# Patient Record
Sex: MALE | Race: WHITE | URBAN - METROPOLITAN AREA
[De-identification: names, ages, dates, MRNs, and addresses within clinical notes are randomized per-mention and may not be internally consistent; named-entity substitution may affect disease eponyms.]

---

## 2023-01-28 ENCOUNTER — OFFICE (OUTPATIENT)
Dept: URBAN - METROPOLITAN AREA CLINIC 6 | Facility: CLINIC | Age: 83
Setting detail: OPHTHALMOLOGY
End: 2023-01-28
Payer: MEDICARE

## 2023-01-28 DIAGNOSIS — H16.223: ICD-10-CM

## 2023-01-28 DIAGNOSIS — H11.042: ICD-10-CM

## 2023-01-28 DIAGNOSIS — H01.005: ICD-10-CM

## 2023-01-28 DIAGNOSIS — H40.053: ICD-10-CM

## 2023-01-28 DIAGNOSIS — H01.001: ICD-10-CM

## 2023-01-28 DIAGNOSIS — H01.004: ICD-10-CM

## 2023-01-28 DIAGNOSIS — H01.002: ICD-10-CM

## 2023-01-28 DIAGNOSIS — H25.13: ICD-10-CM

## 2023-01-28 PROCEDURE — 92250 FUNDUS PHOTOGRAPHY W/I&R: CPT | Performed by: OPHTHALMOLOGY

## 2023-01-28 PROCEDURE — 92014 COMPRE OPH EXAM EST PT 1/>: CPT | Performed by: OPHTHALMOLOGY

## 2023-01-28 PROCEDURE — 92083 EXTENDED VISUAL FIELD XM: CPT | Performed by: OPHTHALMOLOGY

## 2023-01-28 PROCEDURE — 92285 EXTERNAL OCULAR PHOTOGRAPHY: CPT | Performed by: OPHTHALMOLOGY

## 2023-01-28 ASSESSMENT — REFRACTION_CURRENTRX
OS_CYLINDER: -2.50
OS_VPRISM_DIRECTION: PROGS
OD_CYLINDER: +0.75
OS_OVR_VA: 20/
OS_CYLINDER: +0.75
OD_ADD: +2.50
OS_OVR_VA: 20/
OD_SPHERE: +2.75
OS_ADD: +2.50
OS_SPHERE: +2.25
OD_VPRISM_DIRECTION: PROGS
OD_VPRISM_DIRECTION: SV
OS_AXIS: 89
OS_AXIS: 3
OS_SPHERE: +3.25
OD_OVR_VA: 20/
OD_SPHERE: +2.25
OD_AXIS: 88
OD_AXIS: 5
OD_OVR_VA: 20/
OD_CYLINDER: -2.00
OS_VPRISM_DIRECTION: SV

## 2023-01-28 ASSESSMENT — REFRACTION_AUTOREFRACTION
OS_AXIS: 90
OS_SPHERE: +3.50
OD_CYLINDER: -2.50
OD_AXIS: 97
OD_SPHERE: +3.25
OS_CYLINDER: -2.25

## 2023-01-28 ASSESSMENT — REFRACTION_MANIFEST
OS_SPHERE: +3.50
OS_CYLINDER: -2.25
OD_AXIS: 97
OD_SPHERE: +3.25
OD_CYLINDER: -2.50
OU_VA: 20/25-2
OS_AXIS: 90
OS_VA1: 20/30-
OD_VA1: 20/20-
OS_ADD: +2.75
OD_ADD: +2.75

## 2023-01-28 ASSESSMENT — KERATOMETRY
OS_K2POWER_DIOPTERS: 44.00
OS_AXISANGLE_DEGREES: 145
OD_K1POWER_DIOPTERS: 43.50
METHOD_AUTO_MANUAL: AUTO
OD_K2POWER_DIOPTERS: 44.50
OS_K1POWER_DIOPTERS: 43.50
OD_AXISANGLE_DEGREES: 69

## 2023-01-28 ASSESSMENT — CONFRONTATIONAL VISUAL FIELD TEST (CVF)
OS_FINDINGS: FULL
OD_FINDINGS: FULL

## 2023-01-28 ASSESSMENT — SPHEQUIV_DERIVED
OD_SPHEQUIV: 2
OD_SPHEQUIV: 2
OS_SPHEQUIV: 2.375
OS_SPHEQUIV: 2.375

## 2023-01-28 ASSESSMENT — AXIALLENGTH_DERIVED
OD_AL: 22.6668
OS_AL: 22.6168
OS_AL: 22.6168
OD_AL: 22.6668

## 2023-01-28 ASSESSMENT — PACHYMETRY
OD_CT_UM: 620
OD_CT_CORRECTION: -6
OS_CT_UM: 630
OS_CT_CORRECTION: -6

## 2023-01-28 ASSESSMENT — LID EXAM ASSESSMENTS
OS_BLEPHARITIS: LUL T 1+
OD_BLEPHARITIS: RUL T 1+

## 2023-01-28 ASSESSMENT — VISUAL ACUITY
OD_BCVA: 20/40-
OS_BCVA: 20/25+

## 2023-01-28 ASSESSMENT — SUPERFICIAL PUNCTATE KERATITIS (SPK)
OS_SPK: T
OD_SPK: T

## 2023-01-28 ASSESSMENT — CORNEAL PTERYGIUM: OS_PTERYGIUM: NASAL 4MM 3MM

## 2024-01-31 ENCOUNTER — OFFICE (OUTPATIENT)
Dept: URBAN - METROPOLITAN AREA CLINIC 6 | Facility: CLINIC | Age: 84
Setting detail: OPHTHALMOLOGY
End: 2024-01-31
Payer: MEDICARE

## 2024-01-31 DIAGNOSIS — H16.223: ICD-10-CM

## 2024-01-31 DIAGNOSIS — H01.004: ICD-10-CM

## 2024-01-31 DIAGNOSIS — H01.005: ICD-10-CM

## 2024-01-31 DIAGNOSIS — H25.13: ICD-10-CM

## 2024-01-31 DIAGNOSIS — H01.002: ICD-10-CM

## 2024-01-31 DIAGNOSIS — H01.001: ICD-10-CM

## 2024-01-31 DIAGNOSIS — H11.042: ICD-10-CM

## 2024-01-31 DIAGNOSIS — H40.053: ICD-10-CM

## 2024-01-31 PROCEDURE — 92014 COMPRE OPH EXAM EST PT 1/>: CPT | Performed by: OPHTHALMOLOGY

## 2024-01-31 PROCEDURE — 92083 EXTENDED VISUAL FIELD XM: CPT | Performed by: OPHTHALMOLOGY

## 2024-01-31 PROCEDURE — 92250 FUNDUS PHOTOGRAPHY W/I&R: CPT | Performed by: OPHTHALMOLOGY

## 2024-01-31 ASSESSMENT — REFRACTION_CURRENTRX
OS_AXIS: 089
OS_SPHERE: +3.25
OS_VPRISM_DIRECTION: SV
OS_ADD: +2.50
OS_CYLINDER: -2.50
OD_OVR_VA: 20/
OS_OVR_VA: 20/
OD_AXIS: 089
OS_OVR_VA: 20/
OD_CYLINDER: +0.75
OD_VPRISM_DIRECTION: SV
OD_AXIS: 5
OD_SPHERE: +2.25
OD_OVR_VA: 20/
OS_VPRISM_DIRECTION: PROGS
OD_VPRISM_DIRECTION: PROGS
OS_SPHERE: +2.25
OS_AXIS: 3
OD_SPHERE: +2.50
OD_ADD: +2.50
OD_CYLINDER: -1.75
OS_CYLINDER: +0.75

## 2024-01-31 ASSESSMENT — CONFRONTATIONAL VISUAL FIELD TEST (CVF)
OS_FINDINGS: FULL
OD_FINDINGS: FULL

## 2024-01-31 ASSESSMENT — REFRACTION_MANIFEST
OS_CYLINDER: -2.00
OD_SPHERE: +3.50
OD_ADD: +2.75
OD_AXIS: 095
OD_CYLINDER: -2.75
OS_VA1: 20/30-
OS_AXIS: 090
OS_ADD: +2.75
OU_VA: 20/20-2
OD_VA1: 20/25+2
OS_SPHERE: +3.25

## 2024-01-31 ASSESSMENT — SPHEQUIV_DERIVED
OD_SPHEQUIV: 2.125
OS_SPHEQUIV: 2.25

## 2024-01-31 ASSESSMENT — LID EXAM ASSESSMENTS
OS_BLEPHARITIS: LUL T 1+
OD_BLEPHARITIS: RUL T 1+

## 2024-08-20 PROBLEM — Z00.00 ENCOUNTER FOR PREVENTIVE HEALTH EXAMINATION: Status: ACTIVE | Noted: 2024-08-20

## 2024-08-26 ENCOUNTER — APPOINTMENT (OUTPATIENT)
Dept: CARDIOLOGY | Facility: CLINIC | Age: 84
End: 2024-08-26
Payer: MEDICARE

## 2024-08-26 VITALS
OXYGEN SATURATION: 98 % | WEIGHT: 179 LBS | DIASTOLIC BLOOD PRESSURE: 60 MMHG | HEIGHT: 70 IN | HEART RATE: 74 BPM | BODY MASS INDEX: 25.62 KG/M2 | SYSTOLIC BLOOD PRESSURE: 130 MMHG

## 2024-08-26 DIAGNOSIS — Z01.810 ENCOUNTER FOR PREPROCEDURAL CARDIOVASCULAR EXAMINATION: ICD-10-CM

## 2024-08-26 DIAGNOSIS — R01.1 CARDIAC MURMUR, UNSPECIFIED: ICD-10-CM

## 2024-08-26 DIAGNOSIS — Z78.9 OTHER SPECIFIED HEALTH STATUS: ICD-10-CM

## 2024-08-26 DIAGNOSIS — Z86.79 PERSONAL HISTORY OF OTHER DISEASES OF THE CIRCULATORY SYSTEM: ICD-10-CM

## 2024-08-26 DIAGNOSIS — R94.31 ABNORMAL ELECTROCARDIOGRAM [ECG] [EKG]: ICD-10-CM

## 2024-08-26 DIAGNOSIS — N40.0 BENIGN PROSTATIC HYPERPLASIA WITHOUT LOWER URINARY TRACT SYMPMS: ICD-10-CM

## 2024-08-26 DIAGNOSIS — I10 ESSENTIAL (PRIMARY) HYPERTENSION: ICD-10-CM

## 2024-08-26 PROCEDURE — 99204 OFFICE O/P NEW MOD 45 MIN: CPT

## 2024-08-26 PROCEDURE — 93000 ELECTROCARDIOGRAM COMPLETE: CPT

## 2024-08-26 RX ORDER — TAMSULOSIN HYDROCHLORIDE 0.4 MG/1
0.4 CAPSULE ORAL DAILY
Refills: 0 | Status: ACTIVE | COMMUNITY

## 2024-08-26 RX ORDER — VALSARTAN AND HYDROCHLOROTHIAZIDE 320; 12.5 MG/1; MG/1
320-12.5 TABLET, FILM COATED ORAL DAILY
Refills: 0 | Status: ACTIVE | COMMUNITY

## 2024-08-26 NOTE — HISTORY OF PRESENT ILLNESS
[FreeTextEntry1] : The patient is an 84-year-old white male with a past medical history remarkable for hypertension who presents for evaluation prior to shoulder surgery. The patient performs resistance training and cardio 4 days/week.  He is chest pain and dyspnea free.  Recent laboratory results are unavailable

## 2024-08-26 NOTE — PHYSICAL EXAM
[Well Developed] : well developed [Well Nourished] : well nourished [No Acute Distress] : no acute distress [Normal Conjunctiva] : normal conjunctiva [Normal Venous Pressure] : normal venous pressure [No Carotid Bruit] : no carotid bruit [Normal S1, S2] : normal S1, S2 [No Rub] : no rub [No Gallop] : no gallop [Clear Lung Fields] : clear lung fields [Good Air Entry] : good air entry [No Respiratory Distress] : no respiratory distress  [Soft] : abdomen soft [Non Tender] : non-tender [No Masses/organomegaly] : no masses/organomegaly [Normal Bowel Sounds] : normal bowel sounds [Normal Gait] : normal gait [No Edema] : no edema [No Cyanosis] : no cyanosis [No Clubbing] : no clubbing [No Varicosities] : no varicosities [No Rash] : no rash [No Skin Lesions] : no skin lesions [Moves all extremities] : moves all extremities [No Focal Deficits] : no focal deficits [Normal Speech] : normal speech [Alert and Oriented] : alert and oriented [Normal memory] : normal memory [de-identified] : 2/6 systolic murmur at the base and apex

## 2024-08-26 NOTE — PHYSICAL EXAM
[Well Developed] : well developed [Well Nourished] : well nourished [No Acute Distress] : no acute distress [Normal Conjunctiva] : normal conjunctiva [Normal Venous Pressure] : normal venous pressure [No Carotid Bruit] : no carotid bruit [Normal S1, S2] : normal S1, S2 [No Rub] : no rub [No Gallop] : no gallop [Clear Lung Fields] : clear lung fields [Good Air Entry] : good air entry [No Respiratory Distress] : no respiratory distress  [Soft] : abdomen soft [Non Tender] : non-tender [No Masses/organomegaly] : no masses/organomegaly [Normal Bowel Sounds] : normal bowel sounds [Normal Gait] : normal gait [No Edema] : no edema [No Cyanosis] : no cyanosis [No Clubbing] : no clubbing [No Varicosities] : no varicosities [No Rash] : no rash [No Skin Lesions] : no skin lesions [Moves all extremities] : moves all extremities [No Focal Deficits] : no focal deficits [Normal Speech] : normal speech [Alert and Oriented] : alert and oriented [Normal memory] : normal memory [de-identified] : 2/6 systolic murmur at the base and apex

## 2024-08-26 NOTE — DISCUSSION/SUMMARY
[FreeTextEntry1] : Murmur of aortic sclerosis and mitral regurgitation An echocardiogram was ordered to rule out a cardiomyopathy or valvular abnormality as the etiology of his murmur and abnormal ECG  Abnormal ECG Mr. MAXWELL HILL  was instructed to undergo treadmill nuclear stress testing to rule out myocardial ischemia as the etiology of his   abnormal ECG.  Preoperative cardiovascular evaluation: Shoulder surgery Evaluation as noted above  RTO after testing [EKG obtained to assist in diagnosis and management of assessed problem(s)] : EKG obtained to assist in diagnosis and management of assessed problem(s)

## 2024-08-26 NOTE — CARDIOLOGY SUMMARY
[de-identified] : Normal sinus rhythm, Q-wave lead III [de-identified] : - Abnormal ECG -Hypertension

## 2024-08-26 NOTE — CARDIOLOGY SUMMARY
[de-identified] : Normal sinus rhythm, Q-wave lead III [de-identified] : - Abnormal ECG -Hypertension

## 2024-09-06 ENCOUNTER — APPOINTMENT (OUTPATIENT)
Dept: CARDIOLOGY | Facility: CLINIC | Age: 84
End: 2024-09-06
Payer: MEDICARE

## 2024-09-06 PROCEDURE — 93306 TTE W/DOPPLER COMPLETE: CPT

## 2024-09-24 ENCOUNTER — APPOINTMENT (OUTPATIENT)
Dept: CARDIOLOGY | Facility: CLINIC | Age: 84
End: 2024-09-24
Payer: MEDICARE

## 2024-09-24 PROCEDURE — A9502: CPT

## 2024-09-24 PROCEDURE — 78452 HT MUSCLE IMAGE SPECT MULT: CPT

## 2024-09-24 PROCEDURE — 93015 CV STRESS TEST SUPVJ I&R: CPT

## 2024-09-30 ENCOUNTER — APPOINTMENT (OUTPATIENT)
Dept: CARDIOLOGY | Facility: CLINIC | Age: 84
End: 2024-09-30
Payer: MEDICARE

## 2024-09-30 VITALS
SYSTOLIC BLOOD PRESSURE: 138 MMHG | HEIGHT: 69 IN | WEIGHT: 180 LBS | OXYGEN SATURATION: 96 % | DIASTOLIC BLOOD PRESSURE: 66 MMHG | BODY MASS INDEX: 26.66 KG/M2 | HEART RATE: 64 BPM

## 2024-09-30 DIAGNOSIS — I34.0 NONRHEUMATIC MITRAL (VALVE) INSUFFICIENCY: ICD-10-CM

## 2024-09-30 DIAGNOSIS — R01.1 CARDIAC MURMUR, UNSPECIFIED: ICD-10-CM

## 2024-09-30 DIAGNOSIS — Z01.810 ENCOUNTER FOR PREPROCEDURAL CARDIOVASCULAR EXAMINATION: ICD-10-CM

## 2024-09-30 DIAGNOSIS — I35.0 NONRHEUMATIC AORTIC (VALVE) STENOSIS: ICD-10-CM

## 2024-09-30 PROCEDURE — 99214 OFFICE O/P EST MOD 30 MIN: CPT

## 2024-09-30 NOTE — CARDIOLOGY SUMMARY
[de-identified] : - Abnormal ECG -Hypertension - ECHOCARDIOGRAM: 9/6/2024, EF 60%, Moderate mitral regurgitation, trace tricuspid regurgitation PA systolic 25, mild aortic stenosis, aortic valve area 1.8 cm, V1 =1.1, V2 = 1.96, mean gradient 9, peak gradient 15 -NUCLEAR STRESS TEST: 9/24/2024 normal, EF 64%, excellent exercise tolerance

## 2024-09-30 NOTE — HISTORY OF PRESENT ILLNESS
[FreeTextEntry1] : The patient is an 84-year-old white male with a past medical history remarkable for hypertension who presented for evaluation prior to shoulder surgery. Echocardiography demonstrated a normal ejection fraction, moderate mitral regurgitation and only mild aortic stenosis.  Nuclear stress testing demonstrated normal perfusion without evidence of a myocardial infarction or myocardial ischemia The patient performs resistance training and cardio 4 days/week.  He is chest pain and dyspnea free.  Recent laboratory results remain unavailable

## 2024-09-30 NOTE — PHYSICAL EXAM
[Well Developed] : well developed [Well Nourished] : well nourished [No Acute Distress] : no acute distress [Normal Conjunctiva] : normal conjunctiva [Normal Venous Pressure] : normal venous pressure [No Carotid Bruit] : no carotid bruit [Normal S1, S2] : normal S1, S2 [No Rub] : no rub [No Gallop] : no gallop [Clear Lung Fields] : clear lung fields [Good Air Entry] : good air entry [No Respiratory Distress] : no respiratory distress  [Soft] : abdomen soft [Non Tender] : non-tender [No Masses/organomegaly] : no masses/organomegaly [Normal Bowel Sounds] : normal bowel sounds [Normal Gait] : normal gait [No Edema] : no edema [No Cyanosis] : no cyanosis [No Clubbing] : no clubbing [No Varicosities] : no varicosities [No Rash] : no rash [No Skin Lesions] : no skin lesions [Moves all extremities] : moves all extremities [No Focal Deficits] : no focal deficits [Normal Speech] : normal speech [Alert and Oriented] : alert and oriented [Normal memory] : normal memory [de-identified] : 2/6 systolic murmur at the base and apex

## 2024-09-30 NOTE — DISCUSSION/SUMMARY
[FreeTextEntry1] : Preoperative cardiovascular evaluation: Shoulder  In view of the patient's normal ejection fraction, normal stress test, adequate functional capacity, and the absence of unstable angina, congestive heart failure, or severe aortic stenosis, he is at average risk for perioperative cardiovascular morbidity and mortality, and cleared from a cardiology standpoint with the following recommendations: Perioperative beta-blockers, endocarditis prophylaxis, and a postoperative monitored bed are not indicated. DVT prophylaxis at the surgeon's discretion.  murmur of aortic sclerosis and mitral regurgitation Mild aortic stenosis and moderate mitral regurgitation as noted above    Abnormal ECG Normal LV function and the absence of myocardial ischemia by echocardiography and nuclear stress testing   RTO 1 year

## 2025-02-11 ENCOUNTER — OFFICE (OUTPATIENT)
Dept: URBAN - METROPOLITAN AREA CLINIC 6 | Facility: CLINIC | Age: 85
Setting detail: OPHTHALMOLOGY
End: 2025-02-11
Payer: MEDICARE

## 2025-02-11 DIAGNOSIS — H01.002: ICD-10-CM

## 2025-02-11 DIAGNOSIS — H01.005: ICD-10-CM

## 2025-02-11 DIAGNOSIS — H25.13: ICD-10-CM

## 2025-02-11 DIAGNOSIS — H01.001: ICD-10-CM

## 2025-02-11 DIAGNOSIS — H01.004: ICD-10-CM

## 2025-02-11 DIAGNOSIS — H11.042: ICD-10-CM

## 2025-02-11 DIAGNOSIS — H40.053: ICD-10-CM

## 2025-02-11 DIAGNOSIS — H16.223: ICD-10-CM

## 2025-02-11 PROCEDURE — 92014 COMPRE OPH EXAM EST PT 1/>: CPT | Performed by: OPHTHALMOLOGY

## 2025-02-11 PROCEDURE — 92083 EXTENDED VISUAL FIELD XM: CPT | Performed by: OPHTHALMOLOGY

## 2025-02-11 PROCEDURE — 92250 FUNDUS PHOTOGRAPHY W/I&R: CPT | Performed by: OPHTHALMOLOGY

## 2025-02-11 ASSESSMENT — REFRACTION_CURRENTRX
OS_VPRISM_DIRECTION: PROGS
OS_AXIS: 3
OS_SPHERE: +2.25
OS_CYLINDER: -2.50
OD_VPRISM_DIRECTION: PROGS
OD_OVR_VA: 20/
OD_CYLINDER: +0.75
OD_AXIS: 094
OS_ADD: +2.50
OD_OVR_VA: 20/
OS_VPRISM_DIRECTION: SV
OS_OVR_VA: 20/
OD_ADD: +2.50
OS_CYLINDER: +0.75
OD_CYLINDER: -2.00
OD_SPHERE: +2.25
OD_VPRISM_DIRECTION: SV
OS_AXIS: 092
OD_SPHERE: ++2.75
OS_SPHERE: +3.25
OS_OVR_VA: 20/
OD_AXIS: 5

## 2025-02-11 ASSESSMENT — REFRACTION_MANIFEST
OS_VA1: 20/30
OU_VA: 20/20-2
OD_VA1: 20/20+2
OD_SPHERE: +3.00
OS_CYLINDER: -1.50
OD_AXIS: 100
OS_SPHERE: +3.75
OD_CYLINDER: -2.50
OS_ADD: +2.75
OD_ADD: +2.75
OS_AXIS: 100

## 2025-02-11 ASSESSMENT — KERATOMETRY
OS_K2POWER_DIOPTERS: 44.00
OD_K1POWER_DIOPTERS: 43.50
OD_K2POWER_DIOPTERS: 44.50
OS_AXISANGLE_DEGREES: 001
OS_K1POWER_DIOPTERS: 43.50
OD_AXISANGLE_DEGREES: 019
METHOD_AUTO_MANUAL: AUTO

## 2025-02-11 ASSESSMENT — LID EXAM ASSESSMENTS
OD_BLEPHARITIS: RUL T 1+
OS_BLEPHARITIS: LUL T 1+

## 2025-02-11 ASSESSMENT — REFRACTION_AUTOREFRACTION
OD_AXIS: 098
OS_SPHERE: +3.75
OD_CYLINDER: -2.50
OS_AXIS: 089
OD_SPHERE: +3.00
OS_CYLINDER: -1.50

## 2025-02-11 ASSESSMENT — VISUAL ACUITY
OD_BCVA: 20/30-2
OS_BCVA: 20/25

## 2025-02-11 ASSESSMENT — CONFRONTATIONAL VISUAL FIELD TEST (CVF)
OD_FINDINGS: FULL
OS_FINDINGS: FULL

## 2025-02-11 ASSESSMENT — PACHYMETRY
OS_CT_CORRECTION: -6
OD_CT_CORRECTION: -6
OD_CT_UM: 620
OS_CT_UM: 630

## 2025-02-11 ASSESSMENT — SUPERFICIAL PUNCTATE KERATITIS (SPK)
OD_SPK: T
OS_SPK: T

## 2025-02-11 ASSESSMENT — CORNEAL PTERYGIUM: OS_PTERYGIUM: NASAL 4MM 3MM
